# Patient Record
Sex: FEMALE | Race: BLACK OR AFRICAN AMERICAN | NOT HISPANIC OR LATINO | URBAN - METROPOLITAN AREA
[De-identification: names, ages, dates, MRNs, and addresses within clinical notes are randomized per-mention and may not be internally consistent; named-entity substitution may affect disease eponyms.]

---

## 2021-10-31 ENCOUNTER — OFFICE VISIT (OUTPATIENT)
Dept: URBAN - METROPOLITAN AREA CLINIC 35 | Facility: CLINIC | Age: 36
End: 2021-10-31

## 2021-11-23 ENCOUNTER — OFFICE VISIT (OUTPATIENT)
Dept: URBAN - METROPOLITAN AREA CLINIC 94 | Facility: CLINIC | Age: 36
End: 2021-11-23
Payer: COMMERCIAL

## 2021-11-23 ENCOUNTER — LAB OUTSIDE AN ENCOUNTER (OUTPATIENT)
Dept: URBAN - METROPOLITAN AREA CLINIC 94 | Facility: CLINIC | Age: 36
End: 2021-11-23

## 2021-11-23 ENCOUNTER — WEB ENCOUNTER (OUTPATIENT)
Dept: URBAN - METROPOLITAN AREA CLINIC 94 | Facility: CLINIC | Age: 36
End: 2021-11-23

## 2021-11-23 VITALS
HEART RATE: 72 BPM | DIASTOLIC BLOOD PRESSURE: 71 MMHG | BODY MASS INDEX: 47.09 KG/M2 | WEIGHT: 293 LBS | SYSTOLIC BLOOD PRESSURE: 153 MMHG | TEMPERATURE: 97.3 F | HEIGHT: 66 IN

## 2021-11-23 DIAGNOSIS — E66.01 MORBID OBESITY: ICD-10-CM

## 2021-11-23 DIAGNOSIS — K22.4 ESOPHAGEAL SPASM: ICD-10-CM

## 2021-11-23 PROCEDURE — 99203 OFFICE O/P NEW LOW 30 MIN: CPT | Performed by: NURSE PRACTITIONER

## 2021-11-23 PROCEDURE — 99203 OFFICE O/P NEW LOW 30 MIN: CPT | Performed by: INTERNAL MEDICINE

## 2021-11-23 NOTE — HPI-TODAY'S VISIT:
Ms. Knight is seen today upon referral from Dr. Allen Bejarano.  She reports that she is preparing for the gastric sleeve procedure.  She states she had a barium swallow which the results indicated the need for esophageal manometry.  Results of the manometry included distal esophageal spasm with out esophagogastrojejunal outflow obstruction consistent with a spastic esophageal disorder. She was then sent to us for evaluation. She denies chest pain, dysphagia, odynophagia.  She states she may have an episode of acid reflux once or twice per month.  Otherwise no upper GI symptoms.

## 2021-11-23 NOTE — PHYSICAL EXAM GASTROINTESTINAL
Abdomen , soft, Obese, nontender, nondistended , no guarding or rigidity , no masses palpable , normal bowel sounds , Liver and Spleen , no hepatomegaly present , no hepatosplenomegaly , liver nontender , spleen not palpable

## 2021-12-03 ENCOUNTER — OFFICE VISIT (OUTPATIENT)
Dept: URBAN - METROPOLITAN AREA SURGERY CENTER 17 | Facility: SURGERY CENTER | Age: 36
End: 2021-12-03

## 2021-12-08 PROBLEM — 238136002: Status: ACTIVE | Noted: 2021-11-23

## 2021-12-08 PROBLEM — 266434009: Status: ACTIVE | Noted: 2021-11-23

## 2021-12-10 ENCOUNTER — OFFICE VISIT (OUTPATIENT)
Dept: URBAN - METROPOLITAN AREA SURGERY CENTER 30 | Facility: SURGERY CENTER | Age: 36
End: 2021-12-10
Payer: COMMERCIAL

## 2021-12-10 DIAGNOSIS — R13.19 CERVICAL DYSPHAGIA: ICD-10-CM

## 2021-12-10 DIAGNOSIS — R93.3 ABN FINDINGS-GI TRACT: ICD-10-CM

## 2021-12-10 PROCEDURE — 43235 EGD DIAGNOSTIC BRUSH WASH: CPT | Performed by: INTERNAL MEDICINE

## 2021-12-10 PROCEDURE — G8907 PT DOC NO EVENTS ON DISCHARG: HCPCS | Performed by: INTERNAL MEDICINE

## 2021-12-14 ENCOUNTER — OFFICE VISIT (OUTPATIENT)
Dept: URBAN - METROPOLITAN AREA SURGERY CENTER 17 | Facility: SURGERY CENTER | Age: 36
End: 2021-12-14

## 2021-12-22 ENCOUNTER — OFFICE VISIT (OUTPATIENT)
Dept: URBAN - METROPOLITAN AREA CLINIC 78 | Facility: CLINIC | Age: 36
End: 2021-12-22

## 2022-01-04 ENCOUNTER — DASHBOARD ENCOUNTERS (OUTPATIENT)
Age: 37
End: 2022-01-04

## 2022-01-04 ENCOUNTER — OFFICE VISIT (OUTPATIENT)
Dept: URBAN - METROPOLITAN AREA CLINIC 31 | Facility: CLINIC | Age: 37
End: 2022-01-04
Payer: COMMERCIAL

## 2022-01-04 VITALS
HEIGHT: 66 IN | WEIGHT: 293 LBS | OXYGEN SATURATION: 99 % | SYSTOLIC BLOOD PRESSURE: 116 MMHG | HEART RATE: 77 BPM | BODY MASS INDEX: 47.09 KG/M2 | DIASTOLIC BLOOD PRESSURE: 72 MMHG

## 2022-01-04 DIAGNOSIS — K22.4 ESOPHAGEAL DYSMOTILITY: ICD-10-CM

## 2022-01-04 DIAGNOSIS — K21.9 GASTROESOPHAGEAL REFLUX DISEASE WITHOUT ESOPHAGITIS: ICD-10-CM

## 2022-01-04 PROBLEM — 266434009: Status: ACTIVE | Noted: 2022-01-04

## 2022-01-04 PROCEDURE — 99214 OFFICE O/P EST MOD 30 MIN: CPT | Performed by: INTERNAL MEDICINE

## 2022-01-04 NOTE — HPI-TODAY'S VISIT:
Patient presents today to discuss test results with Dr. Parmar.  She is preparing for the gastric sleeve procedure. Patient states that when she saw Dr. Bejarano for her most recent follow-up, he advised her to see Dr. Parmar, specifically, as there have been some discrepancies between what patient has been told and possibly what was indicated in previous results.  Patient denies chest pain, dysphagia, odynophagia. Patient states that if there is anything going on, she has no symptoms whatsoever. The only thing that happens, occasionally, is she will have an episode of heartburn, minor, and this does not require any medication to resolve. The heartburn happens very seldom. Heartburn is not a current issue.   Notes from the operative report from 11/03/2021, done by Dr. Parmar state the following :  IMPRESSION : Distal esophageal spasm without esophagogastrojejunal ouflow obstruction consistent with a spastic esophageal disorder. PLAN AND RECOMMENDATIONS : It is recommended that the patient undergo clinical correlation of symptoms for symptoms of dysphagia or chest pain. Evaluation for reflux should be pursued as reflux is commonly associated with distal esophageal spasm. A 24-hour pH probe may be merited for this. Further recommendations as per Dr. Bejarano.   Additional reviewed data:  EGD with Dr. Daigle on 12/10/2021: was entirely normal.

## 2022-03-02 ENCOUNTER — OFFICE VISIT (OUTPATIENT)
Dept: URBAN - METROPOLITAN AREA CLINIC 35 | Facility: CLINIC | Age: 37
End: 2022-03-02